# Patient Record
Sex: MALE | Race: WHITE | Employment: OTHER | URBAN - METROPOLITAN AREA
[De-identification: names, ages, dates, MRNs, and addresses within clinical notes are randomized per-mention and may not be internally consistent; named-entity substitution may affect disease eponyms.]

---

## 2017-07-24 ENCOUNTER — HOSPITAL ENCOUNTER (OUTPATIENT)
Dept: LAB | Age: 62
Discharge: HOME | End: 2017-07-24
Payer: MEDICARE

## 2017-07-24 ENCOUNTER — OFFICE VISIT (OUTPATIENT)
Dept: FAMILY MEDICINE CLINIC | Age: 62
End: 2017-07-24

## 2017-07-24 VITALS
HEIGHT: 71 IN | HEART RATE: 78 BPM | BODY MASS INDEX: 26.26 KG/M2 | OXYGEN SATURATION: 98 % | DIASTOLIC BLOOD PRESSURE: 86 MMHG | WEIGHT: 187.6 LBS | SYSTOLIC BLOOD PRESSURE: 130 MMHG | TEMPERATURE: 98.6 F | RESPIRATION RATE: 24 BRPM

## 2017-07-24 DIAGNOSIS — E78.2 MIXED HYPERLIPIDEMIA: ICD-10-CM

## 2017-07-24 DIAGNOSIS — Z11.59 NEED FOR HEPATITIS C SCREENING TEST: ICD-10-CM

## 2017-07-24 DIAGNOSIS — I47.1 PSVT (PAROXYSMAL SUPRAVENTRICULAR TACHYCARDIA) (HCC): ICD-10-CM

## 2017-07-24 DIAGNOSIS — F41.1 ANXIETY STATE: ICD-10-CM

## 2017-07-24 DIAGNOSIS — R35.1 NOCTURIA: ICD-10-CM

## 2017-07-24 DIAGNOSIS — R07.89 CHEST DISCOMFORT: Primary | ICD-10-CM

## 2017-07-24 DIAGNOSIS — K58.9 IRRITABLE BOWEL SYNDROME WITHOUT DIARRHEA: ICD-10-CM

## 2017-07-24 PROCEDURE — 36415 COLL VENOUS BLD VENIPUNCTURE: CPT

## 2017-07-24 PROCEDURE — 84153 ASSAY OF PSA TOTAL: CPT

## 2017-07-24 PROCEDURE — 86803 HEPATITIS C AB TEST: CPT

## 2017-07-24 PROCEDURE — 82465 ASSAY BLD/SERUM CHOLESTEROL: CPT

## 2017-07-24 PROCEDURE — 80053 COMPREHEN METABOLIC PANEL: CPT

## 2017-07-24 PROCEDURE — 85025 COMPLETE CBC W/AUTO DIFF WBC: CPT

## 2017-07-24 RX ORDER — DIAZEPAM 5 MG/1
TABLET ORAL
Qty: 90 TAB | Refills: 5 | Status: SHIPPED | OUTPATIENT
Start: 2017-07-24 | End: 2018-03-08 | Stop reason: SDUPTHER

## 2017-07-24 NOTE — PROGRESS NOTES
HISTORY OF PRESENT ILLNESS  Juan Zheng is a 59 y.o. male. epigastricand chest pressure intermittently for several mo. No known triggers. No dispepsia. No melena or brb    Chest Pain    The history is provided by the patient. This is a chronic problem. The problem has not changed since onset. The problem occurs daily. The pain is at a severity of 5/10. The pain is moderate. Associated symptoms include abdominal pain. Nocturia   The history is provided by the patient. This is a recurrent problem. The problem occurs rarely. Associated symptoms include chest pain and abdominal pain. Abdominal Pain   This is a chronic problem. The problem occurs daily. Associated symptoms include chest pain and abdominal pain. Anxiety   The history is provided by the patient. This is a chronic problem. The problem occurs daily. Associated symptoms include chest pain and abdominal pain. Review of Systems   Cardiovascular: Positive for chest pain. Gastrointestinal: Positive for abdominal pain. Genitourinary: Positive for nocturia. Physical Exam   Constitutional: He appears well-developed and well-nourished. HENT:   Head: Normocephalic and atraumatic. Right Ear: External ear normal.   Left Ear: External ear normal.   Nose: Nose normal.   Mouth/Throat: Oropharynx is clear and moist.   Cardiovascular: Normal rate and regular rhythm. Pulmonary/Chest: Effort normal and breath sounds normal.   Abdominal: Soft. Bowel sounds are normal. He exhibits no distension. There is no tenderness. There is no rebound. Skin: Skin is warm and dry. ASSESSMENT and PLAN  Agueda Hollis was seen today for hypertension and cholesterol problem. Diagnoses and all orders for this visit:    Chest discomfort  -     METABOLIC PANEL, COMPREHENSIVE  -     CBC WITH AUTOMATED DIFF  -     CHOLESTEROL, TOTAL  -     XR CHEST PA LAT;  Future  -     AMB POC EKG ROUTINE W/ 12 LEADS, INTER & REP    Irritable bowel syndrome without diarrhea    Anxiety state  -     diazePAM (VALIUM) 5 mg tablet; TAKE 1 TABLET BY MOUTH 3 TIMES A DAY AS NEEDED    PSVT (paroxysmal supraventricular tachycardia) (HCC)    Nocturia  -     PROSTATE SPECIFIC AG  -     Cancel: AMB POC URINALYSIS DIP STICK AUTO W/O MICRO    Mixed hyperlipidemia    Need for hepatitis C screening test  -     HEPATITIS C AB      Follow-up Disposition:  Return in about 4 weeks (around 8/21/2017).

## 2017-07-24 NOTE — MR AVS SNAPSHOT
Visit Information Date & Time Provider Department Dept. Phone Encounter #  
 7/24/2017  2:00 PM Emmett Cox, [facility] [phone] [id number] Follow-up Instructions Return in about 4 weeks (around 8/21/2017). Upcoming Health Maintenance Date Due COLONOSCOPY 2/7/2016 INFLUENZA AGE 9 TO ADULT 8/1/2017 MEDICARE YEARLY EXAM 8/11/2017 DTaP/Tdap/Td series (2 - Td) 5/19/2020 Allergies as of 7/24/2017  Review Complete On: 7/24/2017 By: Izzy Borges Severity Noted Reaction Type Reactions Metoprolol Succinate  11/12/2014    Palpitations Current Immunizations  Reviewed on 8/10/2016 Name Date TDAP Vaccine 5/19/2010 Not reviewed this visit You Were Diagnosed With   
  
 Codes Comments Chest discomfort    -  Primary ICD-10-CM: R07.89 ICD-9-CM: 786.59 Anxiety state     ICD-10-CM: F41.1 ICD-9-CM: 300.00 PSVT (paroxysmal supraventricular tachycardia) (HCC)     ICD-10-CM: I47.1 ICD-9-CM: 427.0 Irritable bowel syndrome without diarrhea     ICD-10-CM: K58.9 ICD-9-CM: 396.7 Nocturia     ICD-10-CM: R35.1 ICD-9-CM: 788.43 Mixed hyperlipidemia     ICD-10-CM: E78.2 ICD-9-CM: 272.2 Need for hepatitis C screening test     ICD-10-CM: Z11.59 
ICD-9-CM: V73.89 Vitals BP Pulse Temp Resp Height(growth percentile) Weight(growth percentile) 130/86 (BP 1 Location: Left arm, BP Patient Position: Sitting) 78 98.6 °F (37 °C) (Oral) 24 5' 11\" (1.803 m) 187 lb 9.6 oz (85.1 kg) SpO2 BMI Smoking Status 98% 26.16 kg/m2 Former Smoker Vitals History BMI and BSA Data Body Mass Index Body Surface Area  
 26.16 kg/m 2 2.06 m 2 Preferred Pharmacy [facility]acy [facility] Phone [facility] [address], [address] [place] [address] [phone] Your Updated Medication List  
  
   
This list is accurate as of: 7/24/17  2:51 PM.  Always use your most recent med list.  
  
  
  
  
 [facility] HEADACHE POWDER 650-195-32 mg Pack Generic drug:  Aspirin-Salicylamide-Caffeine Take  by mouth as needed. diazePAM 5 mg tablet Commonly known as:  VALIUM  
TAKE 1 TABLET BY MOUTH 3 TIMES A DAY AS NEEDED  
  
 polyethylene glycol 17 gram/dose powder Commonly known as:  [place] Gram Take 17 g by mouth daily. Prescriptions Printed Refills  
 diazePAM (VALIUM) 5 mg tablet 5 Sig: TAKE 1 TABLET BY MOUTH 3 TIMES A DAY AS NEEDED Class: Print We Performed the Following AMB POC EKG ROUTINE W/ 12 LEADS, INTER & REP [25788 CPT(R)] AMB POC URINALYSIS DIP STICK AUTO W/O MICRO [33760 CPT(R)] CBC WITH AUTOMATED DIFF [80196 CPT(R)] CHOLESTEROL, TOTAL [28909 CPT(R)] HEPATITIS C AB [48938 CPT(R)] METABOLIC PANEL, COMPREHENSIVE [46217 CPT(R)] PROSTATE SPECIFIC AG (PSA) [id number] CPT(R)] Follow-up Instructions Return in about 4 weeks (around 8/21/2017). To-Do List   
 07/24/2017 Imaging:  XR CHEST PA LAT Introducing [facility]! [place] introduces AFTER-MOUSE patient portal. Now you can access parts of your medical record, email your doctor's office, and request medication refills online. 1. In your internet browser, go to [email or web address] 2. Click on the First Time User? Click Here link in the Sign In box. You will see the New Member Sign Up page. 3. Enter your AFTER-MOUSE Access Code exactly as it appears below. You will not need to use this code after youve completed the sign-up process. If you do not sign up before the expiration date, you must request a new code. · AFTER-MOUSE Access Code: [email or web address] Expires: 10/22/2017  2:51 PM 
 
4. Enter the last four digits of your Social Security Number (xxxx) and Date of Birth (mm/dd/yyyy) as indicated and click Submit. You will be taken to the next sign-up page. 5. Create a Segmint ID. This will be your Segmint login ID and cannot be changed, so think of one that is secure and easy to remember. 6. Create a Segmint password. You can change your password at any time. 7. Enter your Password Reset Question and Answer. This can be used at a later time if you forget your password. 8. Enter your e-mail address. You will receive e-mail notification when new information is available in [address] 9. Click Sign Up. You can now view and download portions of your medical record. 10. Click the Download Summary menu link to download a portable copy of your medical information. If you have questions, please visit the Frequently Asked Questions section of the Segmint website. Remember, Segmint is NOT to be used for urgent needs. For medical emergencies, dial 911. Now available from your iPhone and Android! Please provide this summary of care documentation to your next provider. Your primary care clinician is listed as Yakov Negrete. If you have any questions after today's visit, please call [phone].

## 2017-07-25 LAB
ALBUMIN SERPL-MCNC: 4.3 G/DL (ref 3.6–4.8)
ALBUMIN/GLOB SERPL: 1.5 {RATIO} (ref 1.2–2.2)
ALP SERPL-CCNC: 69 IU/L (ref 39–117)
ALT SERPL-CCNC: 9 IU/L (ref 0–44)
AST SERPL-CCNC: 11 IU/L (ref 0–40)
BASOPHILS # BLD AUTO: 0 X10E3/UL (ref 0–0.2)
BASOPHILS NFR BLD AUTO: 1 %
BILIRUB SERPL-MCNC: 0.5 MG/DL (ref 0–1.2)
BUN SERPL-MCNC: 15 MG/DL (ref 8–27)
BUN/CREAT SERPL: 14 (ref 10–24)
CALCIUM SERPL-MCNC: 9.3 MG/DL (ref 8.6–10.2)
CHLORIDE SERPL-SCNC: 103 MMOL/L (ref 96–106)
CHOLEST SERPL-MCNC: 183 MG/DL (ref 100–199)
CO2 SERPL-SCNC: 25 MMOL/L (ref 18–29)
CREAT SERPL-MCNC: 1.05 MG/DL (ref 0.76–1.27)
EOSINOPHIL # BLD AUTO: 0.2 X10E3/UL (ref 0–0.4)
EOSINOPHIL NFR BLD AUTO: 2 %
ERYTHROCYTE [DISTWIDTH] IN BLOOD BY AUTOMATED COUNT: 13.7 % (ref 12.3–15.4)
GLOBULIN SER CALC-MCNC: 2.8 G/DL (ref 1.5–4.5)
GLUCOSE SERPL-MCNC: 89 MG/DL (ref 65–99)
HCT VFR BLD AUTO: 40.1 % (ref 37.5–51)
HCV AB S/CO SERPL IA: 0.1 S/CO RATIO (ref 0–0.9)
HGB BLD-MCNC: 13.6 G/DL (ref 12.6–17.7)
IMM GRANULOCYTES # BLD: 0 X10E3/UL (ref 0–0.1)
IMM GRANULOCYTES NFR BLD: 0 %
LYMPHOCYTES # BLD AUTO: 2.7 X10E3/UL (ref 0.7–3.1)
LYMPHOCYTES NFR BLD AUTO: 38 %
MCH RBC QN AUTO: 31.4 PG (ref 26.6–33)
MCHC RBC AUTO-ENTMCNC: 33.9 G/DL (ref 31.5–35.7)
MCV RBC AUTO: 93 FL (ref 79–97)
MONOCYTES # BLD AUTO: 0.8 X10E3/UL (ref 0.1–0.9)
MONOCYTES NFR BLD AUTO: 11 %
NEUTROPHILS # BLD AUTO: 3.3 X10E3/UL (ref 1.4–7)
NEUTROPHILS NFR BLD AUTO: 48 %
PLATELET # BLD AUTO: 235 X10E3/UL (ref 150–379)
POTASSIUM SERPL-SCNC: 4.6 MMOL/L (ref 3.5–5.2)
PROT SERPL-MCNC: 7.1 G/DL (ref 6–8.5)
PSA SERPL-MCNC: 0.5 NG/ML (ref 0–4)
RBC # BLD AUTO: 4.33 X10E6/UL (ref 4.14–5.8)
SODIUM SERPL-SCNC: 140 MMOL/L (ref 134–144)
WBC # BLD AUTO: 6.9 X10E3/UL (ref 3.4–10.8)

## 2017-09-08 ENCOUNTER — OFFICE VISIT (OUTPATIENT)
Dept: FAMILY MEDICINE CLINIC | Age: 62
End: 2017-09-08

## 2017-09-08 VITALS
OXYGEN SATURATION: 99 % | DIASTOLIC BLOOD PRESSURE: 80 MMHG | TEMPERATURE: 98.2 F | BODY MASS INDEX: 26.49 KG/M2 | WEIGHT: 189.2 LBS | HEIGHT: 71 IN | HEART RATE: 68 BPM | SYSTOLIC BLOOD PRESSURE: 122 MMHG | RESPIRATION RATE: 18 BRPM

## 2017-09-08 DIAGNOSIS — K58.9 IRRITABLE BOWEL SYNDROME WITHOUT DIARRHEA: ICD-10-CM

## 2017-09-08 DIAGNOSIS — Z00.00 MEDICARE ANNUAL WELLNESS VISIT, SUBSEQUENT: Primary | ICD-10-CM

## 2017-09-08 DIAGNOSIS — J30.9 ALLERGIC RHINITIS, UNSPECIFIED ALLERGIC RHINITIS TRIGGER, UNSPECIFIED RHINITIS SEASONALITY: ICD-10-CM

## 2017-09-08 NOTE — PROGRESS NOTES
This is a Subsequent Medicare Annual Wellness Exam (AWV) (Performed 12 months after IPPE or effective date of Medicare Part B enrollment, Once in a lifetime)    I have reviewed the patient's medical history in detail and updated the computerized patient record. History     Past Medical History:   Diagnosis Date    Depression     Ill-defined condition     Elevated HR    PSVT (paroxysmal supraventricular tachycardia) (HCC) 3/15/2015      Past Surgical History:   Procedure Laterality Date    HX TONSILLECTOMY       Current Outpatient Prescriptions   Medication Sig Dispense Refill    diazePAM (VALIUM) 5 mg tablet TAKE 1 TABLET BY MOUTH 3 TIMES A DAY AS NEEDED 90 Tab 5    polyethylene glycol (MIRALAX) 17 gram/dose powder Take 17 g by mouth daily. 527 g 11    Aspirin-Salicylamide-Caffeine (BC HEADACHE POWDER) 650-195-32 mg Pack Take  by mouth as needed.          Allergies   Allergen Reactions    Metoprolol Succinate Palpitations     Family History   Problem Relation Age of Onset    No Known Problems Mother     Hypertension Father     No Known Problems Sister     No Known Problems Brother      Social History   Substance Use Topics    Smoking status: Former Smoker     Packs/day: 0.25     Quit date: 10/10/2015    Smokeless tobacco: Former User     Quit date: 10/10/2015    Alcohol use No      Comment: rare     Patient Active Problem List   Diagnosis Code    Palpitations R00.2    IBS (irritable bowel syndrome) K58.9    Obstipation K59.00    Anxiety state F41.1    Allergic rhinitis J30.9    Encounter for long-term (current) use of other medications Z79.899    Nocturia R35.1    PSVT (paroxysmal supraventricular tachycardia) ([facility]) I47.1    Family history of prostate cancer Z80.42       Depression Risk Factor Screening:     PHQ over the last two weeks 9/8/2017   Little interest or pleasure in doing things Not at all   Feeling down, depressed or hopeless Not at all   Total Score PHQ 2 0     Alcohol Risk Factor Screening: You do not drink alcohol or very rarely. Functional Ability and Level of Safety:   Hearing Loss  Hearing is good. Activities of Daily Living  The home contains: no safety equipment  Patient does total self care    Fall RiskNo flowsheet data found. Abuse Screen  Patient is not abused    Cognitive Screening   Evaluation of Cognitive Function:  Has your family/caregiver stated any concerns about your memory: no  Normal    Patient Care Team   Patient Care Team:  Faith Pelletier MD as PCP - General  Coreen Duarte    Assessment/Plan   Education and counseling provided:  Are appropriate based on today's review and evaluation    Diagnoses and all orders for this visit:    1. Medicare annual wellness visit, subsequent    2. Irritable bowel syndrome without diarrhea    3.  Allergic rhinitis, unspecified allergic rhinitis trigger, unspecified rhinitis seasonality        Health Maintenance Due   Topic Date Due    COLONOSCOPY  02/07/2016    MEDICARE YEARLY EXAM  08/11/2017

## 2017-09-08 NOTE — MR AVS SNAPSHOT
Visit Information Date & Time Provider Department Dept. Phone Encounter #  
 9/8/2017 11:45 AM Grzegorz Smith [phone] [id number] Follow-up Instructions Return in about 6 months (around 3/8/2018). Upcoming Health Maintenance Date Due COLONOSCOPY 2/7/2016 MEDICARE YEARLY EXAM 8/11/2017 DTaP/Tdap/Td series (2 - Td) 5/19/2020 Allergies as of 9/8/2017  Review Complete On: 9/8/2017 By: Sandip Khan Severity Noted Reaction Type Reactions Metoprolol Succinate  11/12/2014    Palpitations Current Immunizations  Reviewed on 8/10/2016 Name Date TDAP Vaccine 5/19/2010 Not reviewed this visit You Were Diagnosed With   
  
 Codes Comments Medicare annual wellness visit, subsequent    -  Primary ICD-10-CM: Z00.00 ICD-9-CM: V70.0 Irritable bowel syndrome without diarrhea     ICD-10-CM: K58.9 ICD-9-CM: 415.0 Allergic rhinitis, unspecified allergic rhinitis trigger, unspecified rhinitis seasonality     ICD-10-CM: J30.9 ICD-9-CM: 477.9 Vitals BP Pulse Temp Resp Height(growth percentile) Weight(growth percentile) 122/80 (BP 1 Location: Left arm, BP Patient Position: Sitting) 68 98.2 °F (36.8 °C) (Oral) 18 5' 11\" (1.803 m) 189 lb 3.2 oz (85.8 kg) SpO2 BMI Smoking Status 99% 26.39 kg/m2 Former Smoker Vitals History BMI and BSA Data Body Mass Index Body Surface Area  
 26.39 kg/m 2 2.07 m 2 Preferred Pharmacy [facility] [address] [phone] Your Updated Medication List  
  
   
This list is accurate as of: 9/8/17 12:10 PM.  Always use your most recent med list.  
  
  
  
  
 [facility] HEADACHE POWDER 650-195-32 mg Pack Generic drug:  Aspirin-Salicylamide-Caffeine Take  by mouth as needed. diazePAM 5 mg tablet Commonly known as:  VALIUM  
 TAKE 1 TABLET BY MOUTH 3 TIMES A DAY AS NEEDED  
  
 polyethylene glycol 17 gram/dose powder Commonly known as:  [place] Take 17 g by mouth daily. Follow-up Instructions Return in about 6 months (around 3/8/2018). Introducing [facility]! [place] introduces Mandae patient portal. Now you can access parts of your medical record, email your doctor's office, and request medication refills online. 1. In your internet browser, go to [email or web address] 2. Click on the First Time User? Click Here link in the Sign In box. You will see the New Member Sign Up page. 3. Enter your Mandae Access Code exactly as it appears below. You will not need to use this code after youve completed the sign-up process. If you do not sign up before the expiration date, you must request a new code. · Mandae Access Code: [email or web address] Expires: 10/22/2017  2:51 PM 
 
4. Enter the last four digits of your Social Security Number (xxxx) and Date of Birth (mm/dd/yyyy) as indicated and click Submit. You will be taken to the next sign-up page. 5. Create a Mandae ID. This will be your Mandae login ID and cannot be changed, so think of one that is secure and easy to remember. 6. Create a Mandae password. You can change your password at any time. 7. Enter your Password Reset Question and Answer. This can be used at a later time if you forget your password. 8. Enter your e-mail address. You will receive e-mail notification when new information is available in [address] 9. Click Sign Up. You can now view and download portions of your medical record. 10. Click the Download Summary menu link to download a portable copy of your medical information. If you have questions, please visit the Frequently Asked Questions section of the Mandae website. Remember, Mandae is NOT to be used for urgent needs. For medical emergencies, dial 911. Now available from your iPhone and Android! Please provide this summary of care documentation to your next provider. Your primary care clinician is listed as Antonio Patrick. If you have any questions after today's visit, please call [phone].

## 2017-09-09 NOTE — PROGRESS NOTES
HISTORY OF PRESENT ILLNESS  Roberto Valdez is a 57 y.o. male. f/u ibs,psvt,tobaccoism. Doing well    Constipation    The history is provided by the patient. This is a chronic problem. The stool is described as firm. Associated symptoms include abdominal pain, abdominal distention and constipation. Pertinent negatives include no fever and no diarrhea. His past medical history is significant for irritable bowel syndrome. Palpitations    This is a recurrent problem. The problem has been resolved. The problem occurs rarely. Associated symptoms include abdominal pain. Pertinent negatives include no fever and no malaise/fatigue. Nicotine Dependence   This is a chronic problem. The problem occurs daily. The problem has not changed since onset. Associated symptoms include abdominal pain. Review of Systems   Constitutional: Negative for fever and malaise/fatigue. Cardiovascular: Positive for palpitations. Gastrointestinal: Positive for abdominal distention, abdominal pain and constipation. Negative for blood in stool, diarrhea and melena. Genitourinary: Negative for frequency. Physical Exam   Constitutional: He appears well-developed and well-nourished. HENT:   Head: Normocephalic and atraumatic. Right Ear: External ear normal.   Left Ear: External ear normal.   Nose: Nose normal.   Mouth/Throat: Oropharynx is clear and moist.   Cardiovascular: Normal rate and regular rhythm. Pulmonary/Chest: Effort normal and breath sounds normal.   Abdominal: Soft. Bowel sounds are normal.   Skin: Skin is warm and dry. ASSESSMENT and PLAN  Diagnoses and all orders for this visit:    1. Medicare annual wellness visit, subsequent    2. Irritable bowel syndrome without diarrhea    3. Allergic rhinitis, unspecified allergic rhinitis trigger, unspecified rhinitis seasonality    . Continue current meds and treatments.   Pt advised to reduce and discontinue tobacco use    Follow-up Disposition:  Return in about 6 months (around 3/8/2018).

## 2018-03-08 ENCOUNTER — OFFICE VISIT (OUTPATIENT)
Dept: FAMILY MEDICINE CLINIC | Age: 63
End: 2018-03-08

## 2018-03-08 VITALS
OXYGEN SATURATION: 97 % | RESPIRATION RATE: 26 BRPM | BODY MASS INDEX: 26.82 KG/M2 | HEIGHT: 71 IN | SYSTOLIC BLOOD PRESSURE: 122 MMHG | DIASTOLIC BLOOD PRESSURE: 84 MMHG | TEMPERATURE: 98.7 F | WEIGHT: 191.6 LBS | HEART RATE: 84 BPM

## 2018-03-08 DIAGNOSIS — L30.9 ECZEMA, UNSPECIFIED TYPE: ICD-10-CM

## 2018-03-08 DIAGNOSIS — K58.9 IRRITABLE BOWEL SYNDROME WITHOUT DIARRHEA: Primary | ICD-10-CM

## 2018-03-08 DIAGNOSIS — F41.1 ANXIETY STATE: ICD-10-CM

## 2018-03-08 RX ORDER — DIAZEPAM 5 MG/1
TABLET ORAL
Qty: 90 TAB | Refills: 5 | Status: SHIPPED | OUTPATIENT
Start: 2018-03-08 | End: 2018-09-10 | Stop reason: SDUPTHER

## 2018-03-08 RX ORDER — DESOXIMETASONE 0.5 MG/G
GEL TOPICAL 2 TIMES DAILY
Qty: 60 G | Refills: 3 | Status: SHIPPED | OUTPATIENT
Start: 2018-03-08 | End: 2018-09-10 | Stop reason: SDUPTHER

## 2018-03-08 NOTE — MR AVS SNAPSHOT
[address] [facility] 
 
 
 [address] [facility] [place] [address] [address] 
[phone] Patient: Rebecca Pabon MRN: [id number] Visit Information Date & Time Provider Department Dept. Phone Encounter #  
 3/8/2018 12:00 PM Migel Conway, [address] [phone] [id number] Follow-up Instructions Return in about 6 months (around 2018). Upcoming Health Maintenance Date Due COLONOSCOPY 2016 DTaP/Tdap/Td series (2 - Td) 2020 Allergies as of 3/8/2018  Review Complete On: 3/8/2018 By: Levy Stewart Severity Noted Reaction Type Reactions Metoprolol Succinate  2014    Palpitations Current Immunizations  Reviewed on 8/10/2016 Name Date TDAP Vaccine 2010 Not reviewed this visit You Were Diagnosed With   
  
 Codes Comments Irritable bowel syndrome without diarrhea    -  Primary ICD-10-CM: K58.9 ICD-9-CM: 034.2 Anxiety state     ICD-10-CM: F41.1 ICD-9-CM: 300.00 Eczema, unspecified type     ICD-10-CM: L30.9 ICD-9-CM: 692.9 Vitals BP Pulse Temp Resp Height(growth percentile) Weight(growth percentile) 122/84 (BP 1 Location: Right arm, BP Patient Position: Sitting) 84 98.7 °F (37.1 °C) (Oral) 26 5' 11\" (1.803 m) 191 lb 9.6 oz (86.9 kg) SpO2 BMI Smoking Status 97% 26.72 kg/m2 Former Smoker Vitals History BMI and BSA Data Body Mass Index Body Surface Area  
 26.72 kg/m 2 2.09 m 2 Preferred Pharmacy [facility] [address] [phone] Your Updated Medication List  
  
   
This list is accurate as of 3/8/18 12:32 PM.  Always use your most recent med list.  
  
  
  
  
 [facility] HEADACHE POWDER 650-195-32 mg Pack Generic drug:  Aspirin-Salicylamide-Caffeine Take  by mouth as needed. desoximetasone 0.05 % topical gel Commonly known as:  TOPICORT Apply  to affected area two (2) times a day. diazePAM 5 mg tablet Commonly known as:  VALIUM  
TAKE 1 TABLET BY MOUTH 3 TIMES A DAY AS NEEDED  
  
 polyethylene glycol 17 gram/dose powder Commonly known as:  [place] Take 17 g by mouth daily. rifAXIMin 550 mg tablet Commonly known as:  [place] Take 1 Tab by mouth two (2) times a day. Prescriptions Printed Refills  
 diazePAM (VALIUM) 5 mg tablet 5 Sig: TAKE 1 TABLET BY MOUTH 3 TIMES A DAY AS NEEDED Class: Print Prescriptions Sent to Pharmacy Refills  
 desoximetasone (TOPICORT) 0.05 % topical gel 3 Sig: Apply  to affected area two (2) times a day. Class: Normal  
 Pharmacy: [address] Ph #: [phone] Route: Topical  
 rifAXIMin (XIFAXAN) 550 mg tablet 0 Sig: Take 1 Tab by mouth two (2) times a day. Class: Normal  
 Pharmacy: [address] Ph #: [phone] Route: Oral  
  
Follow-up Instructions Return in about 6 months (around 9/8/2018). [facility]! [facility] introduces Senior Moments patient portal. Now you can access parts of your medical record, email your doctor's office, and request medication refills online. 1. In your internet browser, go to [email or web address] 2. Click on the First Time User? Click Here link in the Sign In box. You will see the New Member Sign Up page. 3. Enter your Senior Moments Access Code exactly as it appears below. You will not need to use this code after youve completed the sign-up process. If you do not sign up before the expiration date, you must request a new code. · Senior Moments Access Code: [email or web address] Expires: 6/6/2018 12:32 PM 
 
4.  Enter the last four digits of your Social Security Number (xxxx) and Date of Birth (mm/dd/yyyy) as indicated and click Submit. You will be taken to the next sign-up page. 5. Create a Paloma Mobile ID. This will be your Paloma Mobile login ID and cannot be changed, so think of one that is secure and easy to remember. 6. Create a Paloma Mobile password. You can change your password at any time. 7. Enter your Password Reset Question and Answer. This can be used at a later time if you forget your password. 8. Enter your e-mail address. You will receive e-mail notification when new information is available in [address] 9. Click Sign Up. You can now view and download portions of your medical record. 10. Click the Download Summary menu link to download a portable copy of your medical information. If you have questions, please visit the Frequently Asked Questions section of the Paloma Mobile website. Remember, Paloma Mobile is NOT to be used for urgent needs. For medical emergencies, dial 911. Now available from your iPhone and Android! Please provide this summary of care documentation to your next provider. Your primary care clinician is listed as Thedonovan Alvarado. If you have any questions after today's visit, please call [phone].

## 2018-03-08 NOTE — PROGRESS NOTES
HISTORY OF PRESENT ILLNESS  Tari Miramontes is a 57 y.o. male. f/u anxiety disorder,psvt,ibsc. Doing quite well,episodic bouts of constipation relieved wit prn miralax  Anxiety   The history is provided by the patient. This is a recurrent problem. The problem occurs rarely. The problem has been resolved. Pertinent negatives include no chest pain and no abdominal pain. Palpitations    This is a chronic problem. The problem has been resolved. The problem occurs rarely. Pertinent negatives include no chest pain, no abdominal pain and no back pain. Constipation    This is a recurrent problem. The stool is described as firm. Associated symptoms include abdominal distention and constipation. Pertinent negatives include no abdominal pain, no chills and no back pain. Review of Systems   Constitutional: Negative for chills. Cardiovascular: Positive for palpitations. Negative for chest pain. Gastrointestinal: Positive for abdominal distention and constipation. Negative for abdominal pain, blood in stool and melena. Genitourinary: Negative for frequency. Musculoskeletal: Negative for back pain. Physical Exam   Constitutional: He is oriented to person, place, and time. He appears well-developed and well-nourished. HENT:   Head: Normocephalic and atraumatic. Right Ear: External ear normal.   Left Ear: External ear normal.   Nose: Nose normal.   Mouth/Throat: Oropharynx is clear and moist.   Eyes: Conjunctivae are normal. Pupils are equal, round, and reactive to light. Neck: Normal range of motion. Neck supple. Cardiovascular: Normal rate and regular rhythm. Pulmonary/Chest: Effort normal and breath sounds normal.   Abdominal: Soft. Bowel sounds are normal.   Neurological: He is alert and oriented to person, place, and time. Skin: Skin is warm and dry. ASSESSMENT and PLAN  Diagnoses and all orders for this visit:    1.  Irritable bowel syndrome without diarrhea  -     rifAXIMin (XIFAXAN) 550 mg tablet; Take 1 Tab by mouth two (2) times a day. 2. Anxiety state  -     diazePAM (VALIUM) 5 mg tablet; TAKE 1 TABLET BY MOUTH 3 TIMES A DAY AS NEEDED    3. Eczema, unspecified type  -     desoximetasone (TOPICORT) 0.05 % topical gel; Apply  to affected area two (2) times a day. Follow-up Disposition:  Return in about 6 months (around 9/8/2018).

## 2018-03-09 RX ORDER — DESOXIMETASONE 0.5 MG/G
GEL TOPICAL 2 TIMES DAILY
Qty: 60 G | Refills: 0 | Status: ON HOLD | OUTPATIENT
Start: 2018-03-09 | End: 2021-01-01 | Stop reason: CLARIF

## 2018-09-10 ENCOUNTER — HOSPITAL ENCOUNTER (OUTPATIENT)
Dept: LAB | Age: 63
Discharge: HOME | End: 2018-09-10
Payer: MEDICARE

## 2018-09-10 ENCOUNTER — OFFICE VISIT (OUTPATIENT)
Dept: FAMILY MEDICINE CLINIC | Age: 63
End: 2018-09-10

## 2018-09-10 VITALS
HEIGHT: 71 IN | OXYGEN SATURATION: 98 % | BODY MASS INDEX: 25.76 KG/M2 | DIASTOLIC BLOOD PRESSURE: 80 MMHG | HEART RATE: 73 BPM | RESPIRATION RATE: 20 BRPM | SYSTOLIC BLOOD PRESSURE: 128 MMHG | WEIGHT: 184 LBS | TEMPERATURE: 98 F

## 2018-09-10 DIAGNOSIS — Z00.00 MEDICARE ANNUAL WELLNESS VISIT, SUBSEQUENT: Primary | ICD-10-CM

## 2018-09-10 DIAGNOSIS — Z12.11 SCREEN FOR COLON CANCER: ICD-10-CM

## 2018-09-10 DIAGNOSIS — F41.1 ANXIETY STATE: ICD-10-CM

## 2018-09-10 DIAGNOSIS — K58.9 IRRITABLE BOWEL SYNDROME WITHOUT DIARRHEA: ICD-10-CM

## 2018-09-10 DIAGNOSIS — I47.1 PSVT (PAROXYSMAL SUPRAVENTRICULAR TACHYCARDIA) (HCC): ICD-10-CM

## 2018-09-10 DIAGNOSIS — Z80.42 FAMILY HISTORY OF PROSTATE CANCER: ICD-10-CM

## 2018-09-10 DIAGNOSIS — R35.1 NOCTURIA: ICD-10-CM

## 2018-09-10 PROCEDURE — 84153 ASSAY OF PSA TOTAL: CPT

## 2018-09-10 PROCEDURE — 36415 COLL VENOUS BLD VENIPUNCTURE: CPT

## 2018-09-10 PROCEDURE — 80053 COMPREHEN METABOLIC PANEL: CPT

## 2018-09-10 PROCEDURE — 80061 LIPID PANEL: CPT

## 2018-09-10 PROCEDURE — 85025 COMPLETE CBC W/AUTO DIFF WBC: CPT

## 2018-09-10 RX ORDER — DIAZEPAM 5 MG/1
TABLET ORAL
Qty: 90 TAB | Refills: 5 | Status: SHIPPED | OUTPATIENT
Start: 2018-09-10 | End: 2018-09-10 | Stop reason: CLARIF

## 2018-09-10 RX ORDER — DIAZEPAM 5 MG/1
TABLET ORAL
Qty: 90 TAB | Refills: 5 | Status: SHIPPED | OUTPATIENT
Start: 2018-09-10 | End: 2019-03-11 | Stop reason: SDUPTHER

## 2018-09-10 NOTE — MR AVS SNAPSHOT
[address] [facility] 
 
 
 [address] [facility] [place] [address] [address] 
[phone] Patient: Gabriella Luong MRN: [id number] MPF:[id number] Visit Information Date & Time Provider Department Dept. Phone Encounter #  
 9/10/2018 11:45 AM Grzegorz Bullock [phone] [id number] Follow-up Instructions Return in about 6 months (around 3/10/2019). Upcoming Health Maintenance Date Due  
 GLAUCOMA SCREENING Q2Y 8/8/2018 MEDICARE YEARLY EXAM 9/9/2018 Pneumococcal 65+ Low/Medium Risk (1 of 2 - PCV13) 1/10/2019* COLONOSCOPY 1/10/2019* Influenza Age 5 to Adult 1/10/2019* DTaP/Tdap/Td series (2 - Td) 5/19/2020 *Topic was postponed. The date shown is not the original due date. Allergies as of 9/10/2018  Review Complete On: 3/8/2018 By: Honorio Mars Severity Noted Reaction Type Reactions Metoprolol Succinate  11/12/2014    Palpitations Current Immunizations  Reviewed on 8/10/2016 Name Date TDAP Vaccine 5/19/2010 Not reviewed this visit You Were Diagnosed With   
  
 Codes Comments Medicare annual wellness visit, subsequent    -  Primary ICD-10-CM: Z00.00 ICD-9-CM: V70.0 Irritable bowel syndrome without diarrhea     ICD-10-CM: K58.9 ICD-9-CM: 680.2 PSVT (paroxysmal supraventricular tachycardia) (HCC)     ICD-10-CM: I47.1 ICD-9-CM: 427.0 Anxiety state     ICD-10-CM: F41.1 ICD-9-CM: 300.00 Family history of prostate cancer     ICD-10-CM: Z80.42 
ICD-9-CM: V16.42 Nocturia     ICD-10-CM: R35.1 ICD-9-CM: 788.43 Screen for colon cancer     ICD-10-CM: Z12.11 ICD-9-CM: V76.51 Vitals BP Pulse Temp Resp Height(growth percentile) Weight(growth percentile) 128/80 (BP 1 Location: Left arm, BP Patient Position: Sitting) 73 98 °F (36.7 °C) (Oral) 20 5' 11\" (1.803 m) 184 lb (83.5 kg) SpO2 BMI Smoking Status 98% 25.66 kg/m2 Former Smoker Vitals History BMI and BSA Data Body Mass Index Body Surface Area  
 25.66 kg/m 2 2.05 m 2 Preferred Pharmacy Pharmacy Name Phone [facility] [address] [facility], [address] [place] [address] [phone] Your Updated Medication List  
  
   
This list is accurate as of 9/10/18 12:44 PM.  Always use your most recent med list.  
  
  
  
  
 [facility] HEADACHE POWDER 650-195-32 mg Pack Generic drug:  Aspirin-Salicylamide-Caffeine Take  by mouth as needed. desoximetasone 0.05 % topical gel Commonly known as:  TOPICORT Apply  to affected area two (2) times a day. diazePAM 5 mg tablet Commonly known as:  VALIUM  
TAKE 1 TABLET BY MOUTH 3 TIMES A DAY AS NEEDED  
  
 polyethylene glycol 17 gram/dose powder Commonly known as:  Sheria Bud Take 17 g by mouth daily. We Performed the Following AMB POC URINALYSIS DIP STICK AUTO W/O MICRO [90752 CPT(R)] CBC WITH AUTOMATED DIFF [39983 CPT(R)] LIPID PANEL [34832 CPT(R)] METABOLIC PANEL, COMPREHENSIVE [23605 CPT(R)] PSA, DIAGNOSTIC (PROSTATE SPECIFIC AG) [id number] CPT(R)] REFERRAL TO GASTROENTEROLOGY [LXB86 Custom] Follow-up Instructions Return in about 6 months (around 3/10/2019). Referral Information Referral ID Referred By Referred To  
  
 [id number] [facility] Janneth Rodriguez [facility] [address], [address] [facility] Visits Status Start Date End Date 1 New Request 9/10/18 9/10/19 If your referral has a status of pending review or denied, additional information will be sent to support the outcome of this decision. Introducing [facility]! Stevan Villafuerte introduces [employer] patient portal. Now you can access parts of your medical record, email your doctor's office, and request medication refills online.    
 
1. In your internet browser, go to [email or web address] 2. Click on the First Time User? Click Here link in the Sign In box. You will see the New Member Sign Up page. 3. Enter your [employer]ati Access Code exactly as it appears below. You will not need to use this code after youve completed the sign-up process. If you do not sign up before the expiration date, you must request a new code. · [employer] Access Code: [email or web address] Expires: 12/9/2018 12:44 PM 
 
4. Enter the last four digits of your Social Security Number (xxxx) and Date of Birth (mm/dd/yyyy) as indicated and click Submit. You will be taken to the next sign-up page. 5. Create a [employer] ID. This will be your Properati login ID and cannot be changed, so think of one that is secure and easy to remember. 6. Create a Pro[employer]ati password. You can change your password at any time. 7. Enter your Password Reset Question and Answer. This can be used at a later time if you forget your password. 8. Enter your e-mail address. You will receive e-mail notification when new information is available in [address] 9. Click Sign Up. You can now view and download portions of your medical record. 10. Click the Download Summary menu link to download a portable copy of your medical information. If you have questions, please visit the Frequently Asked Questions section of the Pro[employer]ati website. Remember, [employer]ati is NOT to be used for urgent needs. For medical emergencies, dial 911. Now available from your iPhone and Android! Please provide this summary of care documentation to your next provider. Your primary care clinician is listed as Ananya Humphrey. If you have any questions after today's visit, please call [phone].

## 2018-09-10 NOTE — PROGRESS NOTES
This is the Subsequent Medicare Annual Wellness Exam, performed 12 months or more after the Initial AWV or the last Subsequent AWV 
F/u IBS,Anxiety Disorder. PSVT. Doing well I have reviewed the patient's medical history in detail and updated the computerized patient record. History Past Medical History:  
Diagnosis Date  Depression  Ill-defined condition Elevated HR  
 PSVT (paroxysmal supraventricular tachycardia) ([address] 3/15/2015 Past Surgical History:  
Procedure Laterality Date  HX TONSILLECTOMY Current Outpatient Prescriptions Medication Sig Dispense Refill  desoximetasone (TOPICORT) 0.05 % topical gel Apply  to affected area two (2) times a day. (Patient taking differently: Apply  to affected area two (2) times a day. Indications: prn) 60 g 0  
 diazePAM (VALIUM) 5 mg tablet TAKE 1 TABLET BY MOUTH 3 TIMES A DAY AS NEEDED 90 Tab 5  polyethylene glycol (MIRALAX) 17 gram/dose powder Take 17 g by mouth daily. 527 g 11  Aspirin-Salicylamide-Caffeine (BC HEADACHE POWDER) 650-195-32 mg Pack Take  by mouth as needed. Allergies Allergen Reactions  Metoprolol Succinate Palpitations Family History Problem Relation Age of Onset  Heart Disease Mother  Cancer Father  No Known Problems Sister  No Known Problems Brother Social History Substance Use Topics  Smoking status: Former Smoker Packs/day: 0.25 Quit date: 10/10/2015  Smokeless tobacco: Former User Quit date: 10/10/2015  Alcohol use No  
   Comment: rare Patient Active Problem List  
Diagnosis Code  Palpitations R00.2  IBS (irritable bowel syndrome) K58.9  Obstipation K59.00  Anxiety state F41.1  Allergic rhinitis J30.9  Encounter for long-term (current) use of other medications Z79.899  
 Nocturia R35.1  PSVT (paroxysmal supraventricular tachycardia) ([facility]) I47.1  Family history of prostate cancer Z80.42  
 
 
 Depression Risk Factor Screening: PHQ over the last two weeks 9/10/2018 Little interest or pleasure in doing things Not at all Feeling down, depressed, irritable, or hopeless Not at all Total Score PHQ 2 0 Alcohol Risk Factor Screening: You do not drink alcohol or very rarely. Functional Ability and Level of Safety:  
Hearing Loss Hearing is good. Activities of Daily Living The home contains: handrails Patient does total self care Fall Risk Fall Risk Assessment, last 12 mths 9/10/2018 Able to walk? Yes Fall in past 12 months? No  
 
 
Abuse Screen Patient is not abused Cognitive Screening Evaluation of Cognitive Function: 
Has your family/caregiver stated any concerns about your memory: no 
Normal 
 
 
Review of Systems - General ROS: negative Psychological ROS: negative Respiratory ROS: no cough, shortness of breath, or wheezing Cardiovascular ROS: no chest pain or dyspnea on exertion Gastrointestinal ROS: positive for - constipation Genito-Urinary ROS: no dysuria, trouble voiding, or hematuria Musculoskeletal ROS: negative General appearance - alert, well appearing, and in no distress Mental status - alert, oriented to person, place, and time Eyes - pupils equal and reactive, extraocular eye movements intact Ears - bilateral TM's and external ear canals normal 
Nose - normal and patent, no erythema, discharge or polyps Mouth - mucous membranes moist, pharynx normal without lesions Neck - supple, no significant adenopathy, carotids upstroke normal bilaterally, no bruits, thyroid exam: thyroid is normal in size without nodules or tenderness Chest - clear to auscultation, no wheezes, rales or rhonchi, symmetric air entry Heart - normal rate, regular rhythm, normal S1, S2, no murmurs, rubs, clicks or gallops Abdomen - soft, nontender, nondistended, no masses or organomegaly  Male - no penile lesions or discharge, no testicular masses or tenderness, no hernias Rectal - negative without mass, lesions or tenderness,stool heme negative,prostate wnl Musculoskeletal - no joint tenderness, deformity or swelling Extremities - peripheral pulses normal, no pedal edema, no clubbing or cyanosis Skin - normal coloration and turgor, no rashes, no suspicious skin lesions noted Patient Care Team  
Patient Care Team: 
Lauren Marks MD as PCP - [facility] Assessment/Plan Education and counseling provided: 
Are appropriate based on today's review and evaluation Diagnoses and all orders for this visit: 
 
1. Medicare annual wellness visit, subsequent 2. Irritable bowel syndrome without diarrhea 3. PSVT (paroxysmal supraventricular tachycardia) ([facility] 4. Anxiety state 5. Family history of prostate cancer Lise Mar Continue current meds and treatments,and call if you have any questions. Health Maintenance Due Topic Date Due  
 COLONOSCOPY  02/07/2016  Influenza Age 5 to Adult  08/01/2018  GLAUCOMA SCREENING Q2Y  08/08/2018  Pneumococcal 65+ Low/Medium Risk (1 of 2 - PCV13) 08/08/2018  MEDICARE YEARLY EXAM  09/09/2018

## 2018-09-11 LAB
ALBUMIN SERPL-MCNC: 4.3 G/DL (ref 3.6–4.8)
ALBUMIN/GLOB SERPL: 1.6 {RATIO} (ref 1.2–2.2)
ALP SERPL-CCNC: 58 IU/L (ref 39–117)
ALT SERPL-CCNC: 9 IU/L (ref 0–44)
AST SERPL-CCNC: 12 IU/L (ref 0–40)
BASOPHILS # BLD AUTO: 0 X10E3/UL (ref 0–0.2)
BASOPHILS NFR BLD AUTO: 1 %
BILIRUB SERPL-MCNC: 0.5 MG/DL (ref 0–1.2)
BUN SERPL-MCNC: 14 MG/DL (ref 8–27)
BUN/CREAT SERPL: 14 (ref 10–24)
CALCIUM SERPL-MCNC: 9.7 MG/DL (ref 8.6–10.2)
CHLORIDE SERPL-SCNC: 105 MMOL/L (ref 96–106)
CHOLEST SERPL-MCNC: 159 MG/DL (ref 100–199)
CO2 SERPL-SCNC: 22 MMOL/L (ref 20–29)
CREAT SERPL-MCNC: 1.01 MG/DL (ref 0.76–1.27)
EOSINOPHIL # BLD AUTO: 0.1 X10E3/UL (ref 0–0.4)
EOSINOPHIL NFR BLD AUTO: 1 %
ERYTHROCYTE [DISTWIDTH] IN BLOOD BY AUTOMATED COUNT: 14.8 % (ref 12.3–15.4)
GLOBULIN SER CALC-MCNC: 2.7 G/DL (ref 1.5–4.5)
GLUCOSE SERPL-MCNC: 86 MG/DL (ref 65–99)
HCT VFR BLD AUTO: 42.4 % (ref 37.5–51)
HDLC SERPL-MCNC: 47 MG/DL
HGB BLD-MCNC: 14 G/DL (ref 13–17.7)
IMM GRANULOCYTES # BLD: 0 X10E3/UL (ref 0–0.1)
IMM GRANULOCYTES NFR BLD: 0 %
INTERPRETATION, 910389: NORMAL
LDLC SERPL CALC-MCNC: 97 MG/DL (ref 0–99)
LYMPHOCYTES # BLD AUTO: 1.7 X10E3/UL (ref 0.7–3.1)
LYMPHOCYTES NFR BLD AUTO: 30 %
MCH RBC QN AUTO: 31.5 PG (ref 26.6–33)
MCHC RBC AUTO-ENTMCNC: 33 G/DL (ref 31.5–35.7)
MCV RBC AUTO: 95 FL (ref 79–97)
MONOCYTES # BLD AUTO: 0.5 X10E3/UL (ref 0.1–0.9)
MONOCYTES NFR BLD AUTO: 9 %
NEUTROPHILS # BLD AUTO: 3.3 X10E3/UL (ref 1.4–7)
NEUTROPHILS NFR BLD AUTO: 59 %
PLATELET # BLD AUTO: 247 X10E3/UL (ref 150–379)
POTASSIUM SERPL-SCNC: 4.4 MMOL/L (ref 3.5–5.2)
PROT SERPL-MCNC: 7 G/DL (ref 6–8.5)
PSA SERPL-MCNC: 0.6 NG/ML (ref 0–4)
RBC # BLD AUTO: 4.45 X10E6/UL (ref 4.14–5.8)
SODIUM SERPL-SCNC: 141 MMOL/L (ref 134–144)
TRIGL SERPL-MCNC: 76 MG/DL (ref 0–149)
VLDLC SERPL CALC-MCNC: 15 MG/DL (ref 5–40)
WBC # BLD AUTO: 5.6 X10E3/UL (ref 3.4–10.8)

## 2019-03-01 RX ORDER — AZITHROMYCIN 250 MG/1
TABLET, FILM COATED ORAL
Qty: 6 TAB | Refills: 0 | Status: SHIPPED | OUTPATIENT
Start: 2019-03-01 | End: 2019-03-06

## 2019-03-01 RX ORDER — PROMETHAZINE HYDROCHLORIDE AND DEXTROMETHORPHAN HYDROBROMIDE 6.25; 15 MG/5ML; MG/5ML
5 SYRUP ORAL
Qty: 180 ML | Refills: 1 | Status: SHIPPED | OUTPATIENT
Start: 2019-03-01 | End: 2019-03-08

## 2019-03-01 NOTE — TELEPHONE ENCOUNTER
Patient would like to me seen today for what he believes is a sinus infection. Patients symptoms are sore throat and a lot of congestion in his head and chest. Patient can be reached at [phone].

## 2019-03-11 ENCOUNTER — OFFICE VISIT (OUTPATIENT)
Dept: FAMILY MEDICINE CLINIC | Age: 64
End: 2019-03-11

## 2019-03-11 VITALS
BODY MASS INDEX: 26.71 KG/M2 | OXYGEN SATURATION: 99 % | DIASTOLIC BLOOD PRESSURE: 84 MMHG | TEMPERATURE: 98 F | SYSTOLIC BLOOD PRESSURE: 124 MMHG | RESPIRATION RATE: 20 BRPM | HEIGHT: 71 IN | WEIGHT: 190.8 LBS | HEART RATE: 74 BPM

## 2019-03-11 DIAGNOSIS — F41.1 ANXIETY STATE: ICD-10-CM

## 2019-03-11 DIAGNOSIS — I47.1 PSVT (PAROXYSMAL SUPRAVENTRICULAR TACHYCARDIA) (HCC): Primary | ICD-10-CM

## 2019-03-11 DIAGNOSIS — K58.9 IRRITABLE BOWEL SYNDROME WITHOUT DIARRHEA: ICD-10-CM

## 2019-03-11 DIAGNOSIS — E78.2 MIXED HYPERLIPIDEMIA: ICD-10-CM

## 2019-03-11 RX ORDER — DIAZEPAM 5 MG/1
TABLET ORAL
Qty: 90 TAB | Refills: 5 | Status: SHIPPED | OUTPATIENT
Start: 2019-03-11 | End: 2019-09-09 | Stop reason: SDUPTHER

## 2019-03-11 NOTE — PROGRESS NOTES
Chief Complaint   Patient presents with    Irritable Bowel Syndrome     FU on IBS. 1. Have you been to the ER, urgent care clinic since your last visit? Hospitalized since your last visit? No    2. Have you seen or consulted any other health care providers outside of the [address] [place] [address] since your last visit? Include any pap smears or colon screening.  No

## 2019-03-11 NOTE — PROGRESS NOTES
HISTORY OF PRESENT ILLNESS  Pedro Cárdenas is a 70 y.o. male. f/u PSVT,IBS,Tobaccoism. Doing well,recovering from recent uri  Irritable Bowel Syndrome   The history is provided by the patient. This is a chronic problem. The problem occurs daily. The problem has been gradually improving. Associated symptoms include abdominal pain. Constipation    The history is provided by the patient. This is a chronic problem. Associated symptoms include abdominal pain, nausea and constipation. Pertinent negatives include no fever and no back pain. Anxiety   Associated symptoms include abdominal pain. Review of Systems   Constitutional: Negative for fever and malaise/fatigue. HENT: Positive for congestion. Respiratory: Negative for cough. Cardiovascular: Negative for orthopnea. Gastrointestinal: Positive for abdominal pain, constipation and nausea. Genitourinary: Negative for frequency. Musculoskeletal: Negative for back pain. Psychiatric/Behavioral: Negative for depression. The patient is nervous/anxious. Physical Exam   Constitutional: He is oriented to person, place, and time. He appears well-developed and well-nourished. HENT:   Head: Normocephalic and atraumatic. Right Ear: External ear normal.   Left Ear: External ear normal.   Nose: Nose normal.   Mouth/Throat: Oropharynx is clear and moist.   Eyes: Conjunctivae are normal. Pupils are equal, round, and reactive to light. Neck: Normal range of motion. Neck supple. Cardiovascular: Normal rate and regular rhythm. Pulmonary/Chest: Effort normal and breath sounds normal.   Abdominal: Soft. Bowel sounds are normal. He exhibits no distension. There is no tenderness. There is no rebound. Neurological: He is alert and oriented to person, place, and time. Skin: Skin is warm and dry. ASSESSMENT and PLAN  Diagnoses and all orders for this visit:    1.  PSVT (paroxysmal supraventricular tachycardia) (HCC)  -     METABOLIC PANEL, COMPREHENSIVE    2. Irritable bowel syndrome without diarrhea  -     METABOLIC PANEL, COMPREHENSIVE  -     CBC WITH AUTOMATED DIFF    3. Mixed hyperlipidemia  -     CHOLESTEROL, TOTAL    4. Anxiety state  -     diazePAM (VALIUM) 5 mg tablet; TAKE 1 TABLET BY MOUTH 3 TIMES A DAY AS NEEDED    Doing well,continue current meds and treatments,colonoscopy recommended     Follow-up Disposition:  Return in about 6 months (around 9/11/2019).

## 2019-07-15 ENCOUNTER — OFFICE VISIT (OUTPATIENT)
Dept: FAMILY MEDICINE CLINIC | Age: 64
End: 2019-07-15

## 2019-07-15 VITALS
WEIGHT: 199.2 LBS | DIASTOLIC BLOOD PRESSURE: 78 MMHG | HEIGHT: 71 IN | HEART RATE: 79 BPM | RESPIRATION RATE: 18 BRPM | BODY MASS INDEX: 27.89 KG/M2 | TEMPERATURE: 97.8 F | SYSTOLIC BLOOD PRESSURE: 122 MMHG | OXYGEN SATURATION: 97 %

## 2019-07-15 DIAGNOSIS — L57.0 SOLAR KERATOSIS: Primary | ICD-10-CM

## 2019-07-15 NOTE — PROGRESS NOTES
Chief Complaint Patient presents with  Rash Pt has red small rash over R eye.  
 
1. Have you been to the ER, urgent care clinic since your last visit? Hospitalized since your last visit? No 
 
2. Have you seen or consulted any other health care providers outside of the [address] [place] [address] since your last visit? Include any pap smears or colon screening.  No

## 2019-09-09 ENCOUNTER — OFFICE VISIT (OUTPATIENT)
Dept: FAMILY MEDICINE CLINIC | Age: 64
End: 2019-09-09

## 2019-09-09 VITALS
OXYGEN SATURATION: 99 % | RESPIRATION RATE: 18 BRPM | HEIGHT: 71 IN | TEMPERATURE: 98.1 F | BODY MASS INDEX: 26.49 KG/M2 | SYSTOLIC BLOOD PRESSURE: 120 MMHG | WEIGHT: 189.2 LBS | HEART RATE: 87 BPM | DIASTOLIC BLOOD PRESSURE: 84 MMHG

## 2019-09-09 DIAGNOSIS — I47.1 PSVT (PAROXYSMAL SUPRAVENTRICULAR TACHYCARDIA) (HCC): ICD-10-CM

## 2019-09-09 DIAGNOSIS — K58.9 IRRITABLE BOWEL SYNDROME WITHOUT DIARRHEA: ICD-10-CM

## 2019-09-09 DIAGNOSIS — E78.2 MIXED HYPERLIPIDEMIA: ICD-10-CM

## 2019-09-09 DIAGNOSIS — R35.1 NOCTURIA: ICD-10-CM

## 2019-09-09 DIAGNOSIS — F41.1 ANXIETY STATE: ICD-10-CM

## 2019-09-09 DIAGNOSIS — Z00.00 MEDICARE ANNUAL WELLNESS VISIT, INITIAL: Primary | ICD-10-CM

## 2019-09-09 DIAGNOSIS — Z12.11 SCREEN FOR COLON CANCER: ICD-10-CM

## 2019-09-09 LAB
BILIRUB UR QL STRIP: NEGATIVE
GLUCOSE UR-MCNC: NEGATIVE MG/DL
KETONES P FAST UR STRIP-MCNC: NORMAL MG/DL
PH UR STRIP: 5 [PH] (ref 4.6–8)
PROT UR QL STRIP: NEGATIVE
SP GR UR STRIP: 1.03 (ref 1–1.03)
UA UROBILINOGEN AMB POC: NORMAL (ref 0.2–1)
URINALYSIS CLARITY POC: NORMAL
URINALYSIS COLOR POC: NORMAL
URINE BLOOD POC: NEGATIVE
URINE LEUKOCYTES POC: NEGATIVE
URINE NITRITES POC: NEGATIVE

## 2019-09-09 RX ORDER — DIAZEPAM 5 MG/1
TABLET ORAL
Qty: 90 TAB | Refills: 5 | Status: SHIPPED | OUTPATIENT
Start: 2019-09-09 | End: 2020-04-24 | Stop reason: SDUPTHER

## 2019-09-09 NOTE — PROGRESS NOTES
HISTORY OF PRESENT ILLNESS  Kerry Pemberton is a 75 y.o. male. f/u PSVT,IBS,Tobaccoism. Doing well,recovering from recent uri  Abdominal Pain   The history is provided by the patient. This is a chronic problem. The problem occurs rarely. The problem has been gradually improving. Associated symptoms include abdominal pain. Constipation    The history is provided by the patient. This is a chronic problem. Associated symptoms include abdominal pain and constipation. Pertinent negatives include no dysuria, no fever, no nausea and no back pain. Anxiety   The history is provided by the patient. This is a recurrent problem. The problem occurs rarely. The problem has been gradually improving. Associated symptoms include abdominal pain. Review of Systems   Constitutional: Negative for fever and malaise/fatigue. HENT: Positive for congestion. Respiratory: Negative for cough. Cardiovascular: Negative for orthopnea. Gastrointestinal: Positive for abdominal pain and constipation. Negative for blood in stool, melena and nausea. Genitourinary: Negative for dysuria, frequency and urgency. Musculoskeletal: Negative for back pain and myalgias. Psychiatric/Behavioral: Negative for depression. The patient is nervous/anxious. Physical Exam   Constitutional: He is oriented to person, place, and time. He appears well-developed and well-nourished. HENT:   Head: Normocephalic and atraumatic. Right Ear: External ear normal.   Left Ear: External ear normal.   Nose: Nose normal.   Mouth/Throat: Oropharynx is clear and moist.   Eyes: Pupils are equal, round, and reactive to light. Conjunctivae are normal.   Neck: Normal range of motion. Neck supple. Cardiovascular: Normal rate and regular rhythm. Pulmonary/Chest: Effort normal and breath sounds normal.   Abdominal: Soft. Bowel sounds are normal. He exhibits no distension. There is no tenderness. There is no rebound.    Neurological: He is alert and oriented to person, place, and time. Skin: Skin is warm and dry. ASSESSMENT and PLAN  Diagnoses and all orders for this visit:    1. Medicare annual wellness visit, initial    2. PSVT (paroxysmal supraventricular tachycardia) ([facility] [address] [address]    3. Irritable bowel syndrome without diarrhea  -     METABOLIC PANEL, COMPREHENSIVE  -     CBC WITH AUTOMATED DIFF  -     AMB POC URINALYSIS DIP STICK AUTO W/O MICRO    4. Mixed hyperlipidemia  -     LIPID PANEL    5. Anxiety state  -     diazePAM (VALIUM) 5 mg tablet; TAKE 1 TABLET BY MOUTH 3 TIMES A DAY AS NEEDED    6. Nocturia  -     PSA, DIAGNOSTIC (PROSTATE SPECIFIC AG)    7. Screen for colon cancer  -     COLOGUARD TEST (FECAL DNA COLORECTAL CANCER SCREENING)    Doing well,continue current meds and treatments,colonoscopy recommended     Follow-up and Dispositions    · Return in about 6 months (around 3/9/2020).

## 2019-09-09 NOTE — PROGRESS NOTES
This is the Subsequent Medicare Annual Wellness Exam, performed 12 months or more after the Initial AWV or the last Subsequent AWV    I have reviewed the patient's medical history in detail and updated the computerized patient record. History     Past Medical History:   Diagnosis Date    Depression     Ill-defined condition     Elevated HR    PSVT (paroxysmal supraventricular tachycardia) ([facility] [address] 3/15/2015      Past Surgical History:   Procedure Laterality Date    HX TONSILLECTOMY       Current Outpatient Medications   Medication Sig Dispense Refill    diazePAM (VALIUM) 5 mg tablet TAKE 1 TABLET BY MOUTH 3 TIMES A DAY AS NEEDED 90 Tab 5    desoximetasone (TOPICORT) 0.05 % topical gel Apply  to affected area two (2) times a day. (Patient taking differently: Apply  to affected area two (2) times a day. Indications: prn) 60 g 0    polyethylene glycol (MIRALAX) 17 gram/dose powder Take 17 g by mouth daily. 527 g 11    Aspirin-Salicylamide-Caffeine (BC HEADACHE POWDER) 650-195-32 mg Pack Take  by mouth as needed.          Allergies   Allergen Reactions    Metoprolol Succinate Palpitations     Family History   Problem Relation Age of Onset    Heart Disease Mother     Cancer Father     No Known Problems Sister     No Known Problems Brother      Social History     Tobacco Use    Smoking status: Former Smoker     Packs/day: 0.25     Last attempt to quit: 10/10/2015     Years since quitting: 3.9    Smokeless tobacco: Former User     Quit date: 10/10/2015   Substance Use Topics    Alcohol use: No     Comment: rare     Patient Active Problem List   Diagnosis Code    Palpitations R00.2    IBS (irritable bowel syndrome) K58.9    Obstipation K59.00    Anxiety state F41.1    Allergic rhinitis J30.9    Encounter for long-term (current) use of other medications Z79.899    Nocturia R35.1    PSVT (paroxysmal supraventricular tachycardia) ([facility]) I47.1    Family history of prostate cancer Z80.42       Depression Risk Factor Screening:     3 most recent PHQ Screens 9/9/2019   Little interest or pleasure in doing things Not at all   Feeling down, depressed, irritable, or hopeless Not at all   Total Score PHQ 2 0     Alcohol Risk Factor Screening: You do not drink alcohol or very rarely. Functional Ability and Level of Safety:   Hearing Loss  Hearing is good. Activities of Daily Living  The home contains: no safety equipment. and smoke alarm  Patient does total self care    Fall Risk  Fall Risk Assessment, last 12 mths 9/9/2019   Able to walk? Yes   Fall in past 12 months? No       Abuse Screen  Patient is not abused    Cognitive Screening   Evaluation of Cognitive Function:  Has your family/caregiver stated any concerns about your memory: no  Normal    Patient Care Team   Patient Care Team:  Evelyn Scott MD as PCP - Coreen Larsen    Assessment/Plan   Education and counseling provided:  Are appropriate based on today's review and evaluation    Diagnoses and all orders for this visit:    1. Medicare annual wellness visit, initial    2. PSVT (paroxysmal supraventricular tachycardia) ([facility] [address]    3. Irritable bowel syndrome without diarrhea    4. Mixed hyperlipidemia    5. Anxiety state  -     diazePAM (VALIUM) 5 mg tablet; TAKE 1 TABLET BY MOUTH 3 TIMES A DAY AS NEEDED    6.  Nocturia    Other orders  -     METABOLIC PANEL, COMPREHENSIVE  -     CBC WITH AUTOMATED DIFF  -     LIPID PANEL  -     PSA, DIAGNOSTIC (PROSTATE SPECIFIC AG)  -     AMB POC URINALYSIS DIP STICK AUTO W/O MICRO        Health Maintenance Due   Topic Date Due    COLONOSCOPY  02/07/2016    GLAUCOMA SCREENING Q2Y  08/08/2018    Influenza Age 9 to Adult  08/01/2019    MEDICARE YEARLY EXAM  09/11/2019

## 2019-09-09 NOTE — PROGRESS NOTES
Chief Complaint   Patient presents with    Well Male     Pt getting annual mediare wellness exam.     1. Have you been to the ER, urgent care clinic since your last visit? Hospitalized since your last visit? No    2. Have you seen or consulted any other health care providers outside of the [address] [place] [address] since your last visit? Include any pap smears or colon screening.  No

## 2019-09-10 LAB
ALBUMIN SERPL-MCNC: 4.5 G/DL (ref 3.6–4.8)
ALBUMIN/GLOB SERPL: 1.7 {RATIO} (ref 1.2–2.2)
ALP SERPL-CCNC: 71 IU/L (ref 39–117)
ALT SERPL-CCNC: 12 IU/L (ref 0–44)
AST SERPL-CCNC: 16 IU/L (ref 0–40)
BASOPHILS # BLD AUTO: 0 X10E3/UL (ref 0–0.2)
BASOPHILS NFR BLD AUTO: 1 %
BILIRUB SERPL-MCNC: 0.8 MG/DL (ref 0–1.2)
BUN SERPL-MCNC: 18 MG/DL (ref 8–27)
BUN/CREAT SERPL: 18 (ref 10–24)
CALCIUM SERPL-MCNC: 10 MG/DL (ref 8.6–10.2)
CHLORIDE SERPL-SCNC: 103 MMOL/L (ref 96–106)
CHOLEST SERPL-MCNC: 156 MG/DL (ref 100–199)
CO2 SERPL-SCNC: 22 MMOL/L (ref 20–29)
CREAT SERPL-MCNC: 1.02 MG/DL (ref 0.76–1.27)
EOSINOPHIL # BLD AUTO: 0.1 X10E3/UL (ref 0–0.4)
EOSINOPHIL NFR BLD AUTO: 1 %
ERYTHROCYTE [DISTWIDTH] IN BLOOD BY AUTOMATED COUNT: 13.5 % (ref 12.3–15.4)
GLOBULIN SER CALC-MCNC: 2.6 G/DL (ref 1.5–4.5)
GLUCOSE SERPL-MCNC: 98 MG/DL (ref 65–99)
HCT VFR BLD AUTO: 42.4 % (ref 37.5–51)
HDLC SERPL-MCNC: 42 MG/DL
HGB BLD-MCNC: 15.2 G/DL (ref 13–17.7)
IMM GRANULOCYTES # BLD AUTO: 0 X10E3/UL (ref 0–0.1)
IMM GRANULOCYTES NFR BLD AUTO: 0 %
INTERPRETATION, 910389: NORMAL
LDLC SERPL CALC-MCNC: 103 MG/DL (ref 0–99)
LYMPHOCYTES # BLD AUTO: 1.5 X10E3/UL (ref 0.7–3.1)
LYMPHOCYTES NFR BLD AUTO: 26 %
MCH RBC QN AUTO: 31.1 PG (ref 26.6–33)
MCHC RBC AUTO-ENTMCNC: 35.8 G/DL (ref 31.5–35.7)
MCV RBC AUTO: 87 FL (ref 79–97)
MONOCYTES # BLD AUTO: 0.5 X10E3/UL (ref 0.1–0.9)
MONOCYTES NFR BLD AUTO: 10 %
NEUTROPHILS # BLD AUTO: 3.5 X10E3/UL (ref 1.4–7)
NEUTROPHILS NFR BLD AUTO: 62 %
PLATELET # BLD AUTO: 228 X10E3/UL (ref 150–450)
POTASSIUM SERPL-SCNC: 4.7 MMOL/L (ref 3.5–5.2)
PROT SERPL-MCNC: 7.1 G/DL (ref 6–8.5)
PSA SERPL-MCNC: 0.4 NG/ML (ref 0–4)
RBC # BLD AUTO: 4.88 X10E6/UL (ref 4.14–5.8)
SODIUM SERPL-SCNC: 141 MMOL/L (ref 134–144)
TRIGL SERPL-MCNC: 56 MG/DL (ref 0–149)
VLDLC SERPL CALC-MCNC: 11 MG/DL (ref 5–40)
WBC # BLD AUTO: 5.6 X10E3/UL (ref 3.4–10.8)

## 2019-12-26 ENCOUNTER — OFFICE VISIT (OUTPATIENT)
Dept: FAMILY MEDICINE CLINIC | Age: 64
End: 2019-12-26

## 2019-12-26 VITALS
TEMPERATURE: 98.7 F | OXYGEN SATURATION: 99 % | BODY MASS INDEX: 26.21 KG/M2 | WEIGHT: 187.2 LBS | HEIGHT: 71 IN | HEART RATE: 87 BPM | RESPIRATION RATE: 18 BRPM | DIASTOLIC BLOOD PRESSURE: 78 MMHG | SYSTOLIC BLOOD PRESSURE: 122 MMHG

## 2019-12-26 DIAGNOSIS — N20.0 KIDNEY STONE ON LEFT SIDE: ICD-10-CM

## 2019-12-26 DIAGNOSIS — K58.9 IRRITABLE BOWEL SYNDROME WITHOUT DIARRHEA: ICD-10-CM

## 2019-12-26 DIAGNOSIS — K21.00 GASTROESOPHAGEAL REFLUX DISEASE WITH ESOPHAGITIS: Primary | ICD-10-CM

## 2019-12-26 RX ORDER — TAMSULOSIN HYDROCHLORIDE 0.4 MG/1
CAPSULE ORAL
COMMUNITY
Start: 2019-12-14 | End: 2021-01-01 | Stop reason: ALTCHOICE

## 2019-12-26 RX ORDER — OXYCODONE HYDROCHLORIDE 5 MG/1
TABLET ORAL
COMMUNITY
Start: 2019-12-10 | End: 2021-01-01 | Stop reason: ALTCHOICE

## 2019-12-26 RX ORDER — PHENOL/SODIUM PHENOLATE
20 AEROSOL, SPRAY (ML) MUCOUS MEMBRANE DAILY
Qty: 30 TAB | Refills: 2 | Status: SHIPPED | OUTPATIENT
Start: 2019-12-26 | End: 2020-04-24 | Stop reason: SDUPTHER

## 2019-12-26 RX ORDER — KETOROLAC TROMETHAMINE 10 MG/1
TABLET, FILM COATED ORAL
COMMUNITY
Start: 2019-12-10 | End: 2021-01-01 | Stop reason: ALTCHOICE

## 2019-12-26 RX ORDER — ONDANSETRON 4 MG/1
TABLET, ORALLY DISINTEGRATING ORAL
COMMUNITY
Start: 2019-12-10 | End: 2021-01-01 | Stop reason: ALTCHOICE

## 2019-12-26 NOTE — PROGRESS NOTES
Chief Complaint Patient presents with  
[facility] Follow Up  
  F/U  from [facility].  
 
1. Have you been to the ER, urgent care clinic since your last visit? Hospitalized since your last visit? Yes, [facility] on 12-6-19. 
 
2. Have you seen or consulted any other health care providers outside of the [address] [place] [address] since your last visit? Include any pap smears or colon screening. Yes, [facility] on 12-6-19.

## 2019-12-26 NOTE — PROGRESS NOTES
HISTORY OF PRESENT ILLNESS Florentino Carrillo is a 75 y.o. male. f/u PSVT,IBS,Tobaccoism. Doing well,recovering from recent uri Abdominal Pain The history is provided by the patient. This is a chronic problem. The problem occurs daily. The problem has been gradually improving. Hospital Follow Up The history is provided by the patient. This is a new problem. The current episode started more than 1 week ago. The problem has not changed since onset. Kidney Stone The history is provided by the patient. This is a chronic problem. The problem occurs daily. The problem has not changed since onset. Review of Systems Constitutional: Negative for malaise/fatigue. HENT: Positive for congestion. Respiratory: Negative for cough. Cardiovascular: Negative for orthopnea. Gastrointestinal: Negative for blood in stool and melena. Genitourinary: Negative for frequency and urgency. Musculoskeletal: Negative for myalgias. Psychiatric/Behavioral: Negative for depression. The patient is nervous/anxious. Physical Exam  
Constitutional: He is oriented to person, place, and time. He appears well-developed and well-nourished. HENT:  
Head: Normocephalic and atraumatic. Right Ear: External ear normal.  
Left Ear: External ear normal.  
Nose: Nose normal.  
Mouth/Throat: Oropharynx is clear and moist.  
Eyes: Pupils are equal, round, and reactive to light. Conjunctivae are normal.  
Neck: Normal range of motion. Neck supple. Cardiovascular: Normal rate and regular rhythm. Pulmonary/Chest: Effort normal and breath sounds normal.  
Abdominal: Soft. Bowel sounds are normal. He exhibits no distension. There is no tenderness. There is no rebound. Neurological: He is alert and oriented to person, place, and time. Skin: Skin is warm and dry. Diagnoses and all orders for this visit: 
 
1. Gastroesophageal reflux disease with esophagitis 2. Kidney stone on left side -     Omeprazole delayed release (PRILOSEC D/R) 20 mg tablet; Take 1 Tab by mouth daily. 3. Irritable bowel syndrome without diarrhea,increase miralax Follow-up and Dispositions · Return in about 4 weeks (around 1/23/2020). Doing well,continue current meds and treatments,colonoscopy recommended

## 2020-01-29 ENCOUNTER — OFFICE VISIT (OUTPATIENT)
Dept: FAMILY MEDICINE CLINIC | Age: 65
End: 2020-01-29

## 2020-01-29 VITALS
HEART RATE: 78 BPM | TEMPERATURE: 98 F | WEIGHT: 192.8 LBS | HEIGHT: 71 IN | SYSTOLIC BLOOD PRESSURE: 118 MMHG | OXYGEN SATURATION: 99 % | BODY MASS INDEX: 26.99 KG/M2 | DIASTOLIC BLOOD PRESSURE: 84 MMHG

## 2020-01-29 DIAGNOSIS — K21.00 GASTROESOPHAGEAL REFLUX DISEASE WITH ESOPHAGITIS: Primary | ICD-10-CM

## 2020-01-29 DIAGNOSIS — I47.1 PSVT (PAROXYSMAL SUPRAVENTRICULAR TACHYCARDIA) (HCC): ICD-10-CM

## 2020-01-29 DIAGNOSIS — E78.2 MIXED HYPERLIPIDEMIA: ICD-10-CM

## 2020-01-29 DIAGNOSIS — K58.9 IRRITABLE BOWEL SYNDROME WITHOUT DIARRHEA: ICD-10-CM

## 2020-01-29 RX ORDER — OMEPRAZOLE 20 MG/1
CAPSULE, DELAYED RELEASE ORAL
COMMUNITY
Start: 2019-12-26 | End: 2021-01-01 | Stop reason: ALTCHOICE

## 2020-01-29 NOTE — PROGRESS NOTES
HISTORY OF PRESENT ILLNESS Adela Rai is a 75 y.o. male. GERD sx have improved considerably with ppi. No dysphagia,nocturnal sx,food triggers. BMs ok GERD The history is provided by the patient. This is a recurrent problem. The current episode started more than 1 week ago. The problem occurs daily. The problem has been resolved. Pertinent negatives include no chest pain, no abdominal pain, no headaches and no shortness of breath. Review of Systems Constitutional: Negative for chills, fever and malaise/fatigue. Respiratory: Negative for cough and shortness of breath. Cardiovascular: Negative for chest pain, palpitations and orthopnea. Gastrointestinal: Positive for heartburn. Negative for abdominal pain, blood in stool, constipation and melena. Neurological: Negative for headaches. Physical Exam 
Constitutional:   
   Appearance: Normal appearance. He is normal weight. HENT:  
   Right Ear: Tympanic membrane normal.  
   Left Ear: Tympanic membrane normal.  
   Nose: Nose normal.  
   Mouth/Throat:  
   Mouth: Mucous membranes are moist.  
Eyes:  
   Extraocular Movements: Extraocular movements intact. Pupils: Pupils are equal, round, and reactive to light. Cardiovascular:  
   Rate and Rhythm: Normal rate and regular rhythm. Pulmonary:  
   Effort: Pulmonary effort is normal.  
   Breath sounds: Normal breath sounds. Abdominal:  
   General: Bowel sounds are normal. There is no distension. Palpations: There is no mass. Tenderness: There is no abdominal tenderness. There is no guarding. Neurological:  
   Mental Status: He is alert. ASSESSMENT and PLAN Diagnoses and all orders for this visit: 
 
1. Gastroesophageal reflux disease with esophagitis,symptoms controlled,triggers reviewed 2. Irritable bowel syndrome without diarrhea 3. PSVT (paroxysmal supraventricular tachycardia) ([facility] [address] 4. Mixed hyperlipidemia Follow-up and Dispositions · Return in about 2 months (around 3/29/2020).

## 2020-01-30 NOTE — PROGRESS NOTES
Chief Complaint Patient presents with  Irritable Bowel Syndrome F/U on IBS. 1. Have you been to the ER, urgent care clinic since your last visit? Hospitalized since your last visit? No 
 
2. Have you seen or consulted any other health care providers outside of the [address] [place] [address] since your last visit? Include any pap smears or colon screening.  No

## 2020-04-24 ENCOUNTER — VIRTUAL VISIT (OUTPATIENT)
Dept: FAMILY MEDICINE CLINIC | Age: 65
End: 2020-04-24

## 2020-04-24 DIAGNOSIS — I47.1 PSVT (PAROXYSMAL SUPRAVENTRICULAR TACHYCARDIA) (HCC): ICD-10-CM

## 2020-04-24 DIAGNOSIS — K21.00 GASTROESOPHAGEAL REFLUX DISEASE WITH ESOPHAGITIS: Primary | ICD-10-CM

## 2020-04-24 DIAGNOSIS — E78.2 MIXED HYPERLIPIDEMIA: ICD-10-CM

## 2020-04-24 DIAGNOSIS — N20.0 KIDNEY STONE ON LEFT SIDE: ICD-10-CM

## 2020-04-24 DIAGNOSIS — K58.9 IRRITABLE BOWEL SYNDROME WITHOUT DIARRHEA: ICD-10-CM

## 2020-04-24 DIAGNOSIS — F41.1 ANXIETY STATE: ICD-10-CM

## 2020-04-24 RX ORDER — DIAZEPAM 5 MG/1
TABLET ORAL
Qty: 90 TAB | Refills: 5 | Status: SHIPPED | OUTPATIENT
Start: 2020-04-24 | End: 2021-01-01 | Stop reason: SDUPTHER

## 2020-04-24 RX ORDER — PHENOL/SODIUM PHENOLATE
20 AEROSOL, SPRAY (ML) MUCOUS MEMBRANE DAILY
Qty: 90 TAB | Refills: 3 | Status: ON HOLD | OUTPATIENT
Start: 2020-04-24 | End: 2021-01-01 | Stop reason: CLARIF

## 2020-04-24 NOTE — PROGRESS NOTES
Chief Complaint Patient presents with  GERD  
  F/U on GERD. 1. Have you been to the ER, urgent care clinic since your last visit? Hospitalized since your last visit? No 
 
2. Have you seen or consulted any other health care providers outside of the [address] [place] [address] since your last visit? Include any pap smears or colon screening.  No

## 2020-04-24 NOTE — PROGRESS NOTES
HISTORY OF PRESENT ILLNESS Patient encounter by synchronous (real time) audio-visual technology which is patient initiated. The Patient is aware that this encounter is a billable service with coverage determined by their insurance carrier,as discussed at the time of check in. The patient has given verbal consent to proceed Delta Giacomo is a 75 y.o. male Telephonic/Virtual visit for Abi Dye f/u PSVT,IBS,GERD,Tobaccoism. Doing well,unable to stay off omeprazole for more than 3 days Abdominal Pain The history is provided by the patient. This is a chronic problem. The problem occurs rarely. The problem has been gradually improving. Associated symptoms include chest pain and abdominal pain. Constipation The history is provided by the patient. This is a chronic problem. Associated symptoms include abdominal pain and constipation. Pertinent negatives include no dysuria, no fever, no nausea and no back pain. Anxiety The history is provided by the patient. This is a recurrent problem. The problem occurs rarely. The problem has been gradually improving. Associated symptoms include chest pain and abdominal pain. GERD The problem occurs every several days. The problem has not changed since onset. Associated symptoms include chest pain and abdominal pain. The symptoms are relieved by rest.  
 
 
Review of Systems Constitutional: Negative for fever and malaise/fatigue. HENT: Negative for congestion. Respiratory: Negative for cough. Cardiovascular: Positive for chest pain. Negative for orthopnea. Gastrointestinal: Positive for abdominal pain and constipation. Negative for blood in stool, melena and nausea. Genitourinary: Negative for dysuria, frequency and urgency. Musculoskeletal: Negative for back pain and myalgias. Psychiatric/Behavioral: Negative for depression. The patient is not nervous/anxious. ASSESSMENT and PLAN Diagnoses and all orders for this visit: 
 
 1. Gastroesophageal reflux disease with esophagitis,recurrent ,consider H Pylori testing 2. Irritable bowel syndrome without diarrhea 3. PSVT (paroxysmal supraventricular tachycardia) ([facility] [address] 4. Mixed hyperlipidemia 5. Anxiety state,stable 
-     diazePAM (VALIUM) 5 mg tablet; TAKE 1 TABLET BY MOUTH 3 TIMES A DAY AS NEEDED 6. Kidney stone on left side -     Omeprazole delayed release (PRILOSEC D/R) 20 mg tablet; Take 1 Tab by mouth daily. Doing well,continue current meds and treatments Due to this being a telehealth encounter (During  [address] [facility]),evaluation of the following organ systems was limited:Vitals/Constitutional/EENT,Respiratory,CV,GI,,MS,Neuro,Skin /Heme-Lymph-Imm Pursuant to the emergency declaration under the [address] [place] [address] waiver authority and the [employer] and [facility] Appropriations Act,this Virtual Visit was conducted ,with patient consent,to reduce the patients risk of exposure to Covid 19 and to provide continuity of care  for an established patient. Services were provided through a video synchronous discussion virtually to substitute for in person appointment. This visit was completed using Doxy. me Time in Virtual Visit: 10   minutes

## 2021-01-01 ENCOUNTER — OFFICE VISIT (OUTPATIENT)
Dept: FAMILY MEDICINE CLINIC | Age: 66
End: 2021-01-01
Payer: MEDICARE

## 2021-01-01 VITALS
RESPIRATION RATE: 20 BRPM | TEMPERATURE: 97.5 F | SYSTOLIC BLOOD PRESSURE: 142 MMHG | HEART RATE: 82 BPM | BODY MASS INDEX: 28.17 KG/M2 | HEIGHT: 71 IN | OXYGEN SATURATION: 97 % | DIASTOLIC BLOOD PRESSURE: 88 MMHG | WEIGHT: 201.2 LBS

## 2021-01-01 DIAGNOSIS — K21.00 GASTROESOPHAGEAL REFLUX DISEASE WITH ESOPHAGITIS WITHOUT HEMORRHAGE: ICD-10-CM

## 2021-01-01 DIAGNOSIS — E78.2 MIXED HYPERLIPIDEMIA: ICD-10-CM

## 2021-01-01 DIAGNOSIS — R35.1 NOCTURIA: ICD-10-CM

## 2021-01-01 DIAGNOSIS — Z00.00 MEDICARE ANNUAL WELLNESS VISIT, INITIAL: Primary | ICD-10-CM

## 2021-01-01 DIAGNOSIS — I47.1 PSVT (PAROXYSMAL SUPRAVENTRICULAR TACHYCARDIA) (HCC): ICD-10-CM

## 2021-01-01 DIAGNOSIS — F41.1 ANXIETY STATE: ICD-10-CM

## 2021-01-01 DIAGNOSIS — K58.9 IRRITABLE BOWEL SYNDROME WITHOUT DIARRHEA: ICD-10-CM

## 2021-01-01 PROCEDURE — G0439 PPPS, SUBSEQ VISIT: HCPCS | Performed by: FAMILY MEDICINE

## 2021-01-01 PROCEDURE — 99213 OFFICE O/P EST LOW 20 MIN: CPT | Performed by: FAMILY MEDICINE

## 2021-01-01 RX ORDER — DIAZEPAM 5 MG/1
TABLET ORAL
Qty: 90 TAB | Refills: 5 | Status: SHIPPED | OUTPATIENT
Start: 2021-01-01

## 2021-01-01 RX ORDER — OMEPRAZOLE 20 MG/1
20 CAPSULE, DELAYED RELEASE ORAL DAILY
Qty: 90 CAP | Refills: 3 | Status: ON HOLD | OUTPATIENT
Start: 2021-01-01 | End: 2021-01-01 | Stop reason: CLARIF

## 2021-02-08 NOTE — PROGRESS NOTES
This is the Subsequent Medicare Annual Wellness Exam, performed 12 months or more after the Initial AWV or the last Subsequent AWV I have reviewed the patient's medical history in detail and updated the computerized patient record. Depression Risk Factor Screening:  
 
3 most recent PHQ Screens 2021 Little interest or pleasure in doing things Not at all Feeling down, depressed, irritable, or hopeless Not at all Total Score PHQ 2 0 Alcohol Risk Screen Do you average more than 1 drink per night or more than 7 drinks a week: No 
 
In the past three months have you have had more than 4 drinks containing alcohol on one occasion: No 
 
 
 
Functional Ability and Level of Safety:  
 Hearing: Hearing is good. Activities of Daily Living: The home contains: handrails Patient does total self care Ambulation: with no difficulty Fall Risk: 
Fall Risk Assessment, last 12 mths 2021 Able to walk? Yes Fall in past 12 months? 0 Do you feel unsteady? 0 Are you worried about falling 0 Abuse Screen: 
Patient is not abused Cognitive Screening Has your family/caregiver stated any concerns about your memory: no 
  
Cognitive Screenin Assessment/Plan Education and counseling provided: 
Are appropriate based on today's review and evaluation Diagnoses and all orders for this visit: 
 
1. Medicare annual wellness visit, initial 
 
2. PSVT (paroxysmal supraventricular tachycardia) ([facility] [address] 3. Irritable bowel syndrome without diarrhea -     METABOLIC PANEL, COMPREHENSIVE 
-     CBC WITH AUTOMATED DIFF 4. Gastroesophageal reflux disease with esophagitis without hemorrhage 5. Mixed hyperlipidemia -     LIPID PANEL 6. Nocturia -     PSA, DIAGNOSTIC (PROSTATE SPECIFIC AG) 
-     URINALYSIS W/ RFLX MICROSCOPIC 7. Anxiety state 
-     diazePAM (VALIUM) 5 mg tablet; TAKE 1 TABLET BY MOUTH 3 TIMES A DAY AS NEEDED Other orders -     omeprazole (PRILOSEC) 20 mg capsule; Take 1 Cap by mouth daily. Health Maintenance Due Health Maintenance Due Topic Date Due  
 COVID-19 Vaccine (1 of 2) [birth year]08/1969  Shingrix Vaccine Age 50> (1 of 2) 08/08/2003  GLAUCOMA SCREENING Q2Y  08/08/2018  Pneumococcal 65+ years (1 of 1 - PPSV23) 08/08/2018  DTaP/Tdap/Td series (2 - Td) 05/19/2020  Flu Vaccine (1) 09/01/2020  Medicare Yearly Exam  09/09/2020 Patient Care Team  
Patient Care Team: 
Bernice Au MD as PCP - David Knox, Tatyana Hernandez MD as PCP [facility] Garcialed Provider Stacy Brown History Patient Active Problem List  
Diagnosis Code  Palpitations R00.2  IBS (irritable bowel syndrome) K58.9  Obstipation K59.00  Anxiety state F41.1  Allergic rhinitis J30.9  Encounter for long-term (current) use of other medications Z79.899  
 Nocturia R35.1  PSVT (paroxysmal supraventricular tachycardia) ([facility]) I47.1  Family history of prostate cancer Z80.42 Past Medical History:  
Diagnosis Date  Depression  Ill-defined condition Elevated HR  
 PSVT (paroxysmal supraventricular tachycardia) ([facility] [address] 3/15/2015 Past Surgical History:  
Procedure Laterality Date  HX TONSILLECTOMY Current Outpatient Medications Medication Sig Dispense Refill  diazePAM (VALIUM) 5 mg tablet TAKE 1 TABLET BY MOUTH 3 TIMES A DAY AS NEEDED 90 Tab 5  Omeprazole delayed release (PRILOSEC D/R) 20 mg tablet Take 1 Tab by mouth daily. 90 Tab 3  polyethylene glycol (MIRALAX) 17 gram/dose powder Take 17 g by mouth daily. 527 g 11  Aspirin-Salicylamide-Caffeine (BC HEADACHE POWDER) 650-195-32 mg Pack Take  by mouth as needed.  desoximetasone (TOPICORT) 0.05 % topical gel Apply  to affected area two (2) times a day. (Patient taking differently: Apply  to affected area two (2) times a day. Indications: prn) 60 g 0 Allergies Allergen Reactions  Metoprolol Succinate Palpitations Family History Problem Relation Age of Onset  Heart Disease Mother  Cancer Father  No Known Problems Sister  No Known Problems Brother Social History Tobacco Use  Smoking status: Former Smoker Packs/day: 0.25 Quit date: 10/10/2015 Years since quittin.3  Smokeless tobacco: Former User Quit date: 10/10/2015  Tobacco comment: Pt states he chews nicotine gum. Substance Use Topics  Alcohol use: No  
  Comment: rare

## 2021-02-08 NOTE — PROGRESS NOTES
Subjective:  
 
Gardenia Ormond is a 77 y.o. male presenting for check up. Patient Active Problem List  
Diagnosis Code  Palpitations R00.2  IBS (irritable bowel syndrome) K58.9  Obstipation K59.00  Anxiety state F41.1  Allergic rhinitis J30.9  Encounter for long-term (current) use of other medications Z79.899  
 Nocturia R35.1  PSVT (paroxysmal supraventricular tachycardia) ([facility]) I47.1  Family history of prostate cancer Z80.42 Patient Active Problem List  
 Diagnosis Date Noted  Family history of prostate cancer 03/16/2015  [facility] 03/15[facility]  IBS (irritable bowel syndrome) 05/18/2014  Obstipation 05/18/2014  Anxiety state 05/18/2014  Allergic rhinitis 05/18/2014  Encounter for long-term (current) use of other medications 05/18/2014  Nocturia 05/18/2014  Palpitations 04/06/2012 Current Outpatient Medications Medication Sig Dispense Refill  diazePAM (VALIUM) 5 mg tablet TAKE 1 TABLET BY MOUTH 3 TIMES A DAY AS NEEDED 90 Tab 5  Omeprazole delayed release (PRILOSEC D/R) 20 mg tablet Take 1 Tab by mouth daily. 90 Tab 3  polyethylene glycol (MIRALAX) 17 gram/dose powder Take 17 g by mouth daily. 527 g 11  Aspirin-Salicylamide-Caffeine (BC HEADACHE POWDER) 650-195-32 mg Pack Take  by mouth as needed.  desoximetasone (TOPICORT) 0.05 % topical gel Apply  to affected area two (2) times a day. (Patient taking differently: Apply  to affected area two (2) times a day. Indications: prn) 60 g 0 Allergies Allergen Reactions  Metoprolol Succinate Palpitations Past Medical History:  
Diagnosis Date  Depression  Ill-defined condition Elevated HR  
 PSVT (paroxysmal supraventricular tachycardia) ([facility] [address] 3/15/2015 Past Surgical History:  
Procedure Laterality Date  HX TONSILLECTOMY Family History Problem Relation Age of Onset  Heart Disease Mother  Cancer Father  No Known Problems Sister  No Known Problems Brother Social History Tobacco Use  Smoking status: Former Smoker Packs/day: 0.25 Quit date: 10/10/2015 Years since quittin.3  Smokeless tobacco: Former User Quit date: 10/10/2015  Tobacco comment: Pt states he chews nicotine gum. Substance Use Topics  Alcohol use: No  
  Comment: rare Review of Systems Constitutional: negative Eyes: negative Ears, nose, mouth, throat, and face: negative Respiratory: negative Cardiovascular: negative Gastrointestinal: negative Genitourinary:negative Integument/breast: negative Musculoskeletal:negative Neurological: negative Behavioral/Psych: negative Objective:  
 
Visit Vitals BP (!) 142/88 (BP 1 Location: Left upper arm, BP Patient Position: Sitting, BP Cuff Size: Adult) Pulse 82 Temp 97.5 °F (36.4 °C) (Temporal) Resp 20 Ht 5' 11\" (1.803 m) Wt 201 lb 3.2 oz (91.3 kg) SpO2 97% BMI 28.06 kg/m² Physical exam:  
General appearance - alert, well appearing, and in no distress Mental status - alert, oriented to person, place, and time Eyes - pupils equal and reactive, extraocular eye movements intact Ears - bilateral TM's and external ear canals normal 
Nose - normal and patent, no erythema, discharge or polyps Mouth - mucous membranes moist, pharynx normal without lesions Neck - supple, no significant adenopathy Lymphatics - no palpable lymphadenopathy, no hepatosplenomegaly Chest - clear to auscultation, no wheezes, rales or rhonchi, symmetric air entry Heart - normal rate, regular rhythm, normal S1, S2, no murmurs, rubs, clicks or gallops Abdomen - soft, nontender, nondistended, no masses or organomegaly  Male - PROSTATE EXAM: smooth and symmetric without nodules or tenderness Rectal - negative without mass, lesions or tenderness,stool guiac negative Back exam - full range of motion, no tenderness, palpable spasm or pain on motion Neurological - alert, oriented, normal speech, no focal findings or movement disorder noted Musculoskeletal - no joint tenderness, deformity or swelling Extremities - peripheral pulses normal, no pedal edema, no clubbing or cyanosis Skin - normal coloration and turgor, no rashes, no suspicious skin lesions noted Assessment/Plan:  
 
 
. Diagnoses and all orders for this visit: 
 
1. Medicare annual wellness visit, initial 
 
2. PSVT (paroxysmal supraventricular tachycardia) ([facility] [address] 3. Irritable bowel syndrome without diarrhea -     METABOLIC PANEL, COMPREHENSIVE 
-     CBC WITH AUTOMATED DIFF 4. Gastroesophageal reflux disease with esophagitis without hemorrhage 
-     omeprazole (PRILOSEC) 20 mg capsule; Take 1 Cap by mouth daily. 5. Mixed hyperlipidemia -     LIPID PANEL 6. Nocturia -     PSA, DIAGNOSTIC (PROSTATE SPECIFIC AG) 
-     URINALYSIS W/ RFLX MICROSCOPIC 7. Anxiety state 
-     diazePAM (VALIUM) 5 mg tablet; TAKE 1 TABLET BY MOUTH 3 TIMES A DAY AS NEEDED Follow-up and Dispositions · Return in about 1 year (around 2/8/2022).  
  
Nabila Zamora

## 2021-02-08 NOTE — PROGRESS NOTES
Chief Complaint Patient presents with  Well Male Pt getting annual medicare wellness exam.  
 
1. Have you been to the ER, urgent care clinic since your last visit? Hospitalized since your last visit? No 
 
2. Have you seen or consulted any other health care providers outside of the [address] [place] [address] since your last visit? Include any pap smears or colon screening.  No

## 2021-08-31 PROBLEM — J12.82 PNEUMONIA DUE TO COVID-19 VIRUS: Status: ACTIVE | Noted: 2021-01-01

## 2021-08-31 PROBLEM — J96.01 ACUTE HYPOXEMIC RESPIRATORY FAILURE (HCC): Status: ACTIVE | Noted: 2021-01-01

## 2021-08-31 PROBLEM — U07.1 PNEUMONIA DUE TO COVID-19 VIRUS: Status: ACTIVE | Noted: 2021-01-01

## 2021-08-31 PROBLEM — J18.9 MULTIFOCAL PNEUMONIA: Status: ACTIVE | Noted: 2021-01-01
